# Patient Record
Sex: MALE | HISPANIC OR LATINO | Employment: FULL TIME | URBAN - METROPOLITAN AREA
[De-identification: names, ages, dates, MRNs, and addresses within clinical notes are randomized per-mention and may not be internally consistent; named-entity substitution may affect disease eponyms.]

---

## 2022-12-05 ENCOUNTER — OFFICE VISIT (OUTPATIENT)
Dept: URGENT CARE | Facility: CLINIC | Age: 32
End: 2022-12-05

## 2022-12-05 VITALS
WEIGHT: 180 LBS | HEIGHT: 67 IN | RESPIRATION RATE: 20 BRPM | TEMPERATURE: 98.9 F | BODY MASS INDEX: 28.25 KG/M2 | DIASTOLIC BLOOD PRESSURE: 80 MMHG | SYSTOLIC BLOOD PRESSURE: 128 MMHG | OXYGEN SATURATION: 97 % | HEART RATE: 92 BPM

## 2022-12-05 DIAGNOSIS — R10.10 PAIN OF UPPER ABDOMEN: ICD-10-CM

## 2022-12-05 DIAGNOSIS — K64.9 HEMORRHOIDS, UNSPECIFIED HEMORRHOID TYPE: ICD-10-CM

## 2022-12-05 PROCEDURE — 99203 OFFICE O/P NEW LOW 30 MIN: CPT | Performed by: NURSE PRACTITIONER

## 2022-12-05 RX ORDER — PRAMOXINE HYDROCHLORIDE HYDROCORTISONE ACETATE 100; 100 MG/10G; MG/10G
1 AEROSOL, FOAM TOPICAL EVERY 12 HOURS
Qty: 10 G | Refills: 0 | Status: SHIPPED | OUTPATIENT
Start: 2022-12-05 | End: 2024-03-08

## 2022-12-06 ASSESSMENT — ENCOUNTER SYMPTOMS
ABDOMINAL PAIN: 1
FEVER: 0
SORE THROAT: 0
FLANK PAIN: 1
HEADACHES: 0
CHILLS: 0
SHORTNESS OF BREATH: 0
BLOOD IN STOOL: 1
VOMITING: 0
MYALGIAS: 0
BELCHING: 0
EYE REDNESS: 0
CONSTIPATION: 0
NAUSEA: 0
DIZZINESS: 0

## 2022-12-06 ASSESSMENT — CROHNS DISEASE ACTIVITY INDEX (CDAI): CDAI SCORE: 0

## 2022-12-06 NOTE — PROGRESS NOTES
Subjective:   Chance Abdullahi is a 32 y.o. male who presents for Abdominal Pain (URQ x 2 years)      Abdominal Pain  This is a new problem. The current episode started more than 1 year ago. The onset quality is undetermined. The problem occurs intermittently. The problem has been waxing and waning. The pain is located in the RUQ. The pain is at a severity of 5/10. The pain is moderate. The quality of the pain is sharp. The abdominal pain does not radiate. Pertinent negatives include no belching, constipation, dysuria, fever, frequency, headaches, myalgias, nausea or vomiting. Associated symptoms comments: Bright red blood with bm today   . Nothing aggravates the pain. The pain is relieved by Nothing. He has tried nothing for the symptoms. The treatment provided no relief. Prior diagnostic workup includes ultrasound. There is no history of abdominal surgery, Crohn's disease, gallstones, GERD, irritable bowel syndrome, pancreatitis, PUD or ulcerative colitis.     Review of Systems   Constitutional:  Negative for chills and fever.   HENT:  Negative for sore throat.    Eyes:  Negative for redness.   Respiratory:  Negative for shortness of breath.    Cardiovascular:  Negative for chest pain.   Gastrointestinal:  Positive for abdominal pain and blood in stool. Negative for constipation, nausea and vomiting.   Genitourinary:  Positive for flank pain. Negative for dysuria and frequency.   Musculoskeletal:  Negative for myalgias.   Skin:  Negative for rash.   Neurological:  Negative for dizziness and headaches.     Medications:    Proctofoam HC Foam    Allergies: Patient has no known allergies.    Problem List: Chance Abdullahi does not have a problem list on file.    Surgical History:  No past surgical history on file.    Past Social Hx: Chance Abdullahi  reports that he has never smoked. He does not have any smokeless tobacco history on file. He reports that he does not drink  "alcohol and does not use drugs.     Past Family Hx:  Chance Abdullahi family history is not on file.     Problem list, medications, and allergies reviewed by myself today in Epic.     Objective:     /80   Pulse 92   Temp 37.2 °C (98.9 °F) (Temporal)   Resp 20   Ht 1.702 m (5' 7\")   Wt 81.6 kg (180 lb)   SpO2 97%   BMI 28.19 kg/m²     Physical Exam  Vitals and nursing note reviewed.   Constitutional:       General: He is not in acute distress.     Appearance: He is well-developed.   HENT:      Head: Normocephalic and atraumatic.      Right Ear: External ear normal.      Left Ear: External ear normal.      Nose: Nose normal.      Mouth/Throat:      Mouth: Mucous membranes are moist.   Eyes:      Conjunctiva/sclera: Conjunctivae normal.   Cardiovascular:      Rate and Rhythm: Normal rate.   Pulmonary:      Effort: Pulmonary effort is normal. No respiratory distress.      Breath sounds: Normal breath sounds.   Abdominal:      General: Bowel sounds are normal. There is no distension.      Palpations: Abdomen is soft.      Tenderness: There is no abdominal tenderness. There is no right CVA tenderness, left CVA tenderness, guarding or rebound. Negative signs include Parker's sign, Rovsing's sign, McBurney's sign and psoas sign.      Hernia: No hernia is present.   Genitourinary:     Rectum: External hemorrhoid present. No mass, tenderness or internal hemorrhoid. Normal anal tone.   Musculoskeletal:         General: Normal range of motion.   Skin:     General: Skin is warm and dry.   Neurological:      General: No focal deficit present.      Mental Status: He is alert and oriented to person, place, and time. Mental status is at baseline.      Gait: Gait (gait at baseline) normal.   Psychiatric:         Mood and Affect: Mood is anxious.         Judgment: Judgment normal.       Assessment/Plan:     Diagnosis and associated orders:     1. Hemorrhoids, unspecified hemorrhoid type  Referral to " Gastroenterology    hydrocortisone-pramoxine (PROCTOFOAM HC) 1-1 % Foam      2. Pain of upper abdomen  Referral to Gastroenterology         Comments/MDM:     I personally reviewed prior external notes and prior test results pertinent to today's visit.  Patient with no acute rebound and no guarding, on exam external hemorrhoid noted, dietary modifications discussed.  Patient follows gastroenterology.  Discussed management options, risks and benefits, and alternatives to treatment plan agreed upon.   Red flags discussed and indications to immediately call 911 or present to the Emergency Department.   Supportive care, differential diagnoses, and indications for immediate follow-up discussed with patient.    Patient expresses understanding and agrees to plan. Patient denies any other questions or concerns.            Please note that this dictation was created using voice recognition software. I have made a reasonable attempt to correct obvious errors, but I expect that there are errors of grammar and possibly content that I did not discover before finalizing the note.    This note was electronically signed by Dhruv QUEZADA.

## 2022-12-10 ENCOUNTER — OFFICE VISIT (OUTPATIENT)
Dept: URGENT CARE | Facility: CLINIC | Age: 32
End: 2022-12-10

## 2022-12-10 VITALS
OXYGEN SATURATION: 97 % | TEMPERATURE: 97 F | SYSTOLIC BLOOD PRESSURE: 120 MMHG | WEIGHT: 175.6 LBS | RESPIRATION RATE: 16 BRPM | HEIGHT: 68 IN | HEART RATE: 70 BPM | DIASTOLIC BLOOD PRESSURE: 66 MMHG | BODY MASS INDEX: 26.61 KG/M2

## 2022-12-10 DIAGNOSIS — M70.21 OLECRANON BURSITIS OF RIGHT ELBOW: ICD-10-CM

## 2022-12-10 PROCEDURE — 99213 OFFICE O/P EST LOW 20 MIN: CPT | Performed by: STUDENT IN AN ORGANIZED HEALTH CARE EDUCATION/TRAINING PROGRAM

## 2022-12-10 ASSESSMENT — ENCOUNTER SYMPTOMS
FOCAL WEAKNESS: 0
CHILLS: 0
TINGLING: 0
SENSORY CHANGE: 0
FEVER: 0
WEAKNESS: 0

## 2022-12-10 NOTE — PROGRESS NOTES
"Subjective     Chance Jean Abdullahi is a 32 y.o. male who presents with Other (RIGHT ELBOW SWOLLEN)            Chance is a 32 y.o. male who presents with right elbow swelling.  Patient states he noticed swelling yesterday when he was putting on his jacket.  No mechanism of injury or trauma.  Patient denies pain, radiation of pain or numbness or tingling.  No bruising or erythema.  No fever/chills.  She is right-handed.  Patient works at a [employer].      Review of Systems   Constitutional:  Negative for chills, fever and malaise/fatigue.   Musculoskeletal:  Negative for joint pain (elbow swelling, no pain).   Neurological:  Negative for tingling, sensory change, focal weakness and weakness.   All other systems reviewed and are negative.           Objective     /66 (BP Location: Left arm, Patient Position: Sitting)   Pulse 70   Temp 36.1 °C (97 °F) (Temporal)   Resp 16   Ht 1.727 m (5' 8\")   Wt 79.7 kg (175 lb 9.6 oz)   SpO2 97%   BMI 26.70 kg/m²      Physical Exam  Vitals reviewed.   Constitutional:       Appearance: Normal appearance.   HENT:      Head: Normocephalic and atraumatic.   Eyes:      Extraocular Movements: Extraocular movements intact.      Conjunctiva/sclera: Conjunctivae normal.      Pupils: Pupils are equal, round, and reactive to light.   Cardiovascular:      Rate and Rhythm: Normal rate and regular rhythm.   Pulmonary:      Effort: Pulmonary effort is normal.      Breath sounds: Normal breath sounds.   Musculoskeletal:         General: Normal range of motion.      Right upper arm: Normal.      Right elbow: Effusion present. Normal range of motion. No tenderness.      Right forearm: Normal.        Arms:       Cervical back: Normal range of motion.      Comments: Right olecranon bursa.  Overlying skin intact without erythema or warmth. FROM of right upper extremity.  Neurovascularly intact.   Skin:     General: Skin is warm and dry.      Capillary Refill: Capillary refill takes " less than 2 seconds.   Neurological:      General: No focal deficit present.      Mental Status: He is alert.                           Assessment & Plan        1. Olecranon bursitis of right elbow  - Referral to Sports Medicine     Supportive care measures (rest, ice, compression, elevation, OTC Tylenol/ibuprofen) and indications for immediate follow-up discussed with patient. Pathogenesis of diagnosis discussed including typical length and natural progression.  Patient education printout provided for patient reviewing olecranon bursitis.  Sports medicine referral placed if needed.    Instructed to return to urgent care or nearest emergency department if symptoms fail to improve, for any change in condition, further concerns, or new concerning symptoms.    Patient states understanding and agrees with the plan of care and discharge instructions.

## 2023-04-18 ENCOUNTER — TELEPHONE (OUTPATIENT)
Dept: HEALTH INFORMATION MANAGEMENT | Facility: OTHER | Age: 33
End: 2023-04-18

## 2024-03-08 ENCOUNTER — OFFICE VISIT (OUTPATIENT)
Dept: MEDICAL GROUP | Facility: PHYSICIAN GROUP | Age: 34
End: 2024-03-08

## 2024-03-08 VITALS
HEART RATE: 98 BPM | SYSTOLIC BLOOD PRESSURE: 118 MMHG | DIASTOLIC BLOOD PRESSURE: 80 MMHG | WEIGHT: 188.8 LBS | HEIGHT: 67 IN | BODY MASS INDEX: 29.63 KG/M2 | OXYGEN SATURATION: 99 % | TEMPERATURE: 96.9 F

## 2024-03-08 DIAGNOSIS — I83.90 VARICOSE VEINS OF CALF: ICD-10-CM

## 2024-03-08 DIAGNOSIS — F41.9 ANXIETY: ICD-10-CM

## 2024-03-08 DIAGNOSIS — F32.1 CURRENT MODERATE EPISODE OF MAJOR DEPRESSIVE DISORDER WITHOUT PRIOR EPISODE (HCC): ICD-10-CM

## 2024-03-08 DIAGNOSIS — R03.0 ELEVATED BLOOD PRESSURE READING IN OFFICE WITH WHITE COAT SYNDROME, WITHOUT DIAGNOSIS OF HYPERTENSION: ICD-10-CM

## 2024-03-08 DIAGNOSIS — R10.11 RIGHT UPPER QUADRANT ABDOMINAL PAIN: ICD-10-CM

## 2024-03-08 DIAGNOSIS — R00.2 PALPITATIONS: ICD-10-CM

## 2024-03-08 DIAGNOSIS — M79.89 SOFT TISSUE MASS: ICD-10-CM

## 2024-03-08 DIAGNOSIS — Z00.00 WELLNESS EXAMINATION: ICD-10-CM

## 2024-03-08 PROBLEM — R10.13 EPIGASTRIC PAIN: Status: ACTIVE | Noted: 2024-03-08

## 2024-03-08 PROCEDURE — 99215 OFFICE O/P EST HI 40 MIN: CPT | Performed by: FAMILY MEDICINE

## 2024-03-08 PROCEDURE — 3074F SYST BP LT 130 MM HG: CPT | Performed by: FAMILY MEDICINE

## 2024-03-08 PROCEDURE — 3079F DIAST BP 80-89 MM HG: CPT | Performed by: FAMILY MEDICINE

## 2024-03-08 PROCEDURE — 93000 ELECTROCARDIOGRAM COMPLETE: CPT | Performed by: FAMILY MEDICINE

## 2024-03-08 RX ORDER — OMEPRAZOLE 20 MG/1
20 CAPSULE, DELAYED RELEASE ORAL DAILY
Qty: 90 CAPSULE | Refills: 0 | Status: SHIPPED | OUTPATIENT
Start: 2024-03-08

## 2024-03-08 ASSESSMENT — ENCOUNTER SYMPTOMS
SHORTNESS OF BREATH: 0
COUGH: 0
CLAUDICATION: 0
NAUSEA: 0
VOMITING: 0
ABDOMINAL PAIN: 1
PALPITATIONS: 1

## 2024-03-08 ASSESSMENT — PATIENT HEALTH QUESTIONNAIRE - PHQ9
5. POOR APPETITE OR OVEREATING: 3 - NEARLY EVERY DAY
SUM OF ALL RESPONSES TO PHQ QUESTIONS 1-9: 18
CLINICAL INTERPRETATION OF PHQ2 SCORE: 4

## 2024-03-08 NOTE — PROGRESS NOTES
"Subjective:     CC: Establish Care, RUQ pain, Skin mass    HPI:   Chance presents today with    Problem   Right Upper Quadrant Abdominal Pain    Chronic  Started couple of years ago  Had a normal lab work, US and CT  Describes it as a burning sensation and will radiate to his R shoulder and neck  Patient has never seen GI or tried any medications  Patient feels like he can't eat   Patient notes the pain gets worse after eating anything  -so will eat small amounts, won't eat anything spicy but does not know what triggers it  Does noted for the first time having R neck pain a few days ago     Current Moderate Episode of Major Depressive Disorder Without Prior Episode (Hcc)    Patient thinks it is more anxiety related to his health  Patient was going to psychology when this all started years ago  Patient states that he was on adderall and lorazepam but he stopped taking these  -had SE on the adderall    3/8/24 PHQ-9:  18     Palpitations    Chronic  Episodic  Feels as though his heart is racing  Thinks it happens \"when I get in my head\"  Also reports burning on the L side of his chest and occasional throbbing  -can also feel sharp like a needle  Noticed it more at rest     Elevated Blood Pressure Reading in Office With White Coat Syndrome, Without Diagnosis of Hypertension    Chronic  States that he is anxious and is afraid of doctors  Patient states that he changed jobs  -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  Patient now works at an ice cream shop  Does not check at home     Varicose Veins of Calf    Started 2 months ago  Will get swollen and achy  -not as much currently  R side       Soft Tissue Mass    First noticed after moving heavy furniture  -this was 3/2/24  Denies pain  Maybe aches on occasion  -and this has mostly resolved  Is not really achy now       Anxiety    Chronic  Patient was going to psychology when this all started years ago  Patient states that he was on adderall and lorazepam but he stopped " "taking these  -had SE on the adderall           ROS:  Review of Systems   Respiratory:  Negative for cough and shortness of breath.    Cardiovascular:  Positive for chest pain and palpitations. Negative for claudication and leg swelling.   Gastrointestinal:  Positive for abdominal pain. Negative for nausea and vomiting.       Objective:     Exam:  /80 (BP Location: Left arm, Patient Position: Sitting, BP Cuff Size: Adult)   Pulse 98   Temp 36.1 °C (96.9 °F) (Temporal)   Ht 1.702 m (5' 7\")   Wt 85.6 kg (188 lb 12.8 oz)   SpO2 99%   BMI 29.57 kg/m²  Body mass index is 29.57 kg/m².    Physical Exam  Constitutional:       Appearance: Normal appearance.   HENT:      Head: Normocephalic and atraumatic.      Mouth/Throat:      Mouth: Mucous membranes are moist.   Eyes:      Extraocular Movements: Extraocular movements intact.      Conjunctiva/sclera: Conjunctivae normal.      Pupils: Pupils are equal, round, and reactive to light.   Cardiovascular:      Rate and Rhythm: Normal rate and regular rhythm.      Heart sounds: No murmur heard.  Pulmonary:      Effort: Pulmonary effort is normal.      Breath sounds: Normal breath sounds. No wheezing.   Abdominal:      General: Abdomen is flat. Bowel sounds are normal.      Palpations: Abdomen is soft.   Musculoskeletal:      Cervical back: Neck supple. No tenderness. Normal range of motion.      Thoracic back: No tenderness or bony tenderness. Normal range of motion.      Lumbar back: Swelling present.      Comments: Mobile 2 in x 2 in non-tender mass over lumbar spine   Skin:     General: Skin is warm and dry.   Neurological:      General: No focal deficit present.      Mental Status: He is alert and oriented to person, place, and time.   Psychiatric:         Attention and Perception: Attention normal.         Mood and Affect: Mood is anxious.         Speech: Speech normal.         Behavior: Behavior normal.         Assessment & Plan:     33 y.o. male with the " following -    1. Right upper quadrant abdominal pain  - DX-CERVICAL SPINE-2 OR 3 VIEWS; Future  - DX-THORACIC SPINE-2 VIEWS; Future  - AMYLASE; Future  - LIPASE; Future  - omeprazole (PRILOSEC) 20 MG delayed-release capsule; Take 1 Capsule by mouth every day.  Dispense: 90 Capsule; Refill: 0    Chronic  Etiology unclear  -Did review prior ultrasound and CT scan which only found a fatty and enlarged liver  -DDx includes gallbladder dysfunction, GERD, pancreatitis, pneumonia (though this is very low on the list), musculoskeletal  Will order cervical and thoracic xrays, lab work and start patient on a PPI  May need GI referral at a later date    2. Current moderate episode of major depressive disorder without prior episode (HCC)    Chronic  Uncontrolled  Appears to be primarily focused around his health so we will workup his complaints and then see if he needs additional medication for this    3. Palpitations  - TSH WITH REFLEX TO FT4; Future  - EC-ECHOCARDIOGRAM COMPLETE W/O CONT; Future  - MAGNESIUM; Future  - PHOSPHORUS; Future  - Cardiac Event Monitor; Future  - REFERRAL TO CARDIOLOGY  - Clinic EKG    Etiology unclear but suspect due to anxiety  Will initiate full cardiac workup though  EKG in office today was normal    4. Elevated blood pressure reading in office with white coat syndrome, without diagnosis of hypertension  - TSH WITH REFLEX TO FT4; Future    Normal in office today  Suspect it is more due to anxiety  Will monitor    5. Varicose veins of calf    Chronic  Discussed etiology and treatment options  -Would like to try compression stockings at this time    6. Soft tissue mass  - US-ABDOMEN LTD (SOFT TISSUE); Future    Suspect possible lipoma  Order ultrasound    7. Anxiety    Chronic  Uncontrolled  Appears to be primarily focused around his health so we will workup his complaints and then see if he needs additional medication for this    8. Wellness examination  - CBC WITH DIFFERENTIAL; Future  - Comp  Metabolic Panel; Future  - HEP C VIRUS ANTIBODY; Future  - HEMOGLOBIN A1C; Future  - Lipid Profile; Future  - TSH WITH REFLEX TO FT4; Future  - HIV AG/AB COMBO ASSAY SCREENING; Future     I spent a total of 40 minutes with record review, exam, communication with the patient, communication with other providers, and documentation of this encounter.      Return in about 4 weeks (around 4/5/2024).    Please note that this dictation was created using voice recognition software. I have made every reasonable attempt to correct obvious errors, but I expect that there are errors of grammar and possibly content that I did not discover before finalizing the note.

## 2024-03-19 ENCOUNTER — TELEPHONE (OUTPATIENT)
Dept: CARDIOLOGY | Facility: MEDICAL CENTER | Age: 34
End: 2024-03-19

## 2024-03-19 NOTE — TELEPHONE ENCOUNTER
Please call and see if he wants to switch to once of us in [place], the cost for self-pay patient is dramatically less      [facility] ([address])

## 2024-03-20 ENCOUNTER — APPOINTMENT (OUTPATIENT)
Dept: CARDIOLOGY | Facility: MEDICAL CENTER | Age: 34
End: 2024-03-20
Attending: FAMILY MEDICINE

## 2024-03-20 NOTE — TELEPHONE ENCOUNTER
Called pt,  [phone] and reviewed CW recommendations.  [facility] number given to pt, [phone] with instructions to schedule and follow up on price difference.  He verbalizes understanding and states no other concerns or questions at this time.  Pt is appreciative of information given.    FV for tomorrow 3/20 at 0845 cancelled.

## 2024-06-11 DIAGNOSIS — R10.11 RIGHT UPPER QUADRANT ABDOMINAL PAIN: ICD-10-CM

## 2024-06-11 RX ORDER — OMEPRAZOLE 20 MG/1
20 CAPSULE, DELAYED RELEASE ORAL DAILY
Qty: 90 CAPSULE | Refills: 0 | Status: SHIPPED | OUTPATIENT
Start: 2024-06-11

## 2024-06-11 NOTE — TELEPHONE ENCOUNTER
Received request via: Pharmacy    Was the patient seen in the last year in this department? Yes    Does the patient have an active prescription (recently filled or refills available) for medication(s) requested? No    Pharmacy Name: [id number] - [place], [place] - [address] within [facility]     Does the patient have assisted Plus and need 100 day supply (blood pressure, diabetes and cholesterol meds only)? Patient does not have SCP

## 2025-01-03 ENCOUNTER — OFFICE VISIT (OUTPATIENT)
Dept: URGENT CARE | Facility: CLINIC | Age: 35
End: 2025-01-03
Payer: COMMERCIAL

## 2025-01-03 VITALS
WEIGHT: 203.26 LBS | BODY MASS INDEX: 30.81 KG/M2 | DIASTOLIC BLOOD PRESSURE: 86 MMHG | TEMPERATURE: 98.4 F | RESPIRATION RATE: 16 BRPM | HEART RATE: 81 BPM | OXYGEN SATURATION: 97 % | HEIGHT: 68 IN | SYSTOLIC BLOOD PRESSURE: 120 MMHG

## 2025-01-03 DIAGNOSIS — Z20.818 STREPTOCOCCUS EXPOSURE: ICD-10-CM

## 2025-01-03 DIAGNOSIS — Z87.19 HISTORY OF GASTROESOPHAGEAL REFLUX (GERD): ICD-10-CM

## 2025-01-03 LAB — S PYO DNA SPEC NAA+PROBE: NOT DETECTED

## 2025-01-03 PROCEDURE — 99213 OFFICE O/P EST LOW 20 MIN: CPT

## 2025-01-03 PROCEDURE — 87651 STREP A DNA AMP PROBE: CPT

## 2025-01-03 ASSESSMENT — ENCOUNTER SYMPTOMS
NAUSEA: 0
DIAPHORESIS: 0
COUGH: 0
PALPITATIONS: 0
DIARRHEA: 0
DIZZINESS: 0
ABDOMINAL PAIN: 0
WEAKNESS: 0
CHILLS: 0
FEVER: 0
VOMITING: 0
SHORTNESS OF BREATH: 0
BLOOD IN STOOL: 0

## 2025-01-04 NOTE — PROGRESS NOTES
"Subjective:   Chance Abdullahi is a 34 y.o. male who presents for Pharyngitis (X 2 days  tasted blood in his mouth, his son tested positive 1 week ago)    HPI: + Strep exposure, son tested positive last week, requesting testing. Patient also has concerns regarding \"tasting blood\" this morning, he attests he has been drinking wine late at night. +reflux last night - this is an ongoing, chronic problem, he states he was placed on omeprazole x1 month by PCP at [facility]. Denies unintentional weight loss, chest pain/palpitations, difficulty swallowing. Denies abdominal pain or blood in stool. No vomiting or diarrhea. He is established with [facility] and he states they did an upper EGD in September. He states recent blood work completed within the past month with [facility] without any concerns / abnormal results.    Review of Systems   Constitutional:  Negative for chills, diaphoresis, fever and malaise/fatigue.   Respiratory:  Negative for cough and shortness of breath.    Cardiovascular:  Negative for chest pain and palpitations.   Gastrointestinal:  Negative for abdominal pain, blood in stool, diarrhea, melena, nausea and vomiting.   Neurological:  Negative for dizziness and weakness.   All other systems reviewed and are negative.      Medications:    omeprazole    Allergies: Patient has no known allergies.    Problem List: Chance Abdullahi does not have any pertinent problems on file.    Surgical History:  No past surgical history on file.    Past Social Hx: Chance Abdullahi  reports that he has never smoked. He has never used smokeless tobacco. He reports current alcohol use. He reports that he does not use drugs.     Past Family Hx:  Chance Abdullahi family history includes Heart Disease in his mother; Hypertension in his father.     Problem list, medications, and allergies reviewed by myself today in Epic.     Objective:     BP " "120/86   Pulse 81   Temp 36.9 °C (98.4 °F) (Temporal)   Resp 16   Ht 1.727 m (5' 8\")   Wt 92.2 kg (203 lb 4.2 oz)   SpO2 97%   BMI 30.91 kg/m²     Physical Exam  Vitals reviewed.   Constitutional:       Appearance: Normal appearance.   HENT:      Head: Normocephalic and atraumatic.      Right Ear: External ear normal.      Left Ear: External ear normal.      Nose: Nose normal.      Mouth/Throat:      Mouth: Mucous membranes are moist.   Eyes:      Conjunctiva/sclera: Conjunctivae normal.   Cardiovascular:      Rate and Rhythm: Normal rate.      Heart sounds: Normal heart sounds.   Pulmonary:      Effort: Pulmonary effort is normal.      Breath sounds: Normal breath sounds.   Abdominal:      General: Abdomen is flat. Bowel sounds are normal. There is no distension.      Palpations: Abdomen is soft.      Tenderness: There is no abdominal tenderness. There is no guarding.   Skin:     General: Skin is warm and dry.      Capillary Refill: Capillary refill takes less than 2 seconds.   Neurological:      Mental Status: He is alert and oriented to person, place, and time.       Assessment/Plan:     Diagnosis and associated orders:     1. Streptococcus exposure  POCT C[employer] A Strep - PCR      2. History of gastroesophageal reflux (GERD)           Comments/MDM:     POCT Strep negative - discussed results with patient via phone call   Continue with Omeprazole, close monitoring of symptoms - avoid trigger foods or eating within 2-3h of bedtime.   Follow-up with PCP and GI Specialist. We discussed red-flag symptoms and instructed to return to clinic or nearest emergency department for any change in condition, further concerns, or worsening symptoms.  Spoke with patient today 1/4/24 - states he is feeling better, no symptoms / alarm symptoms.     Differential diagnosis, natural history, supportive care, and indications for immediate follow-up discussed. Advised the patient to follow-up with PCP for recheck, " reevaluation, and consideration of further management.    Deances KAJAL Trejo, APRN, FNP-BC